# Patient Record
Sex: FEMALE | Race: WHITE | NOT HISPANIC OR LATINO | URBAN - NONMETROPOLITAN AREA
[De-identification: names, ages, dates, MRNs, and addresses within clinical notes are randomized per-mention and may not be internally consistent; named-entity substitution may affect disease eponyms.]

---

## 2018-04-04 ENCOUNTER — NEW PATIENT (OUTPATIENT)
Dept: URBAN - NONMETROPOLITAN AREA CLINIC 6 | Facility: CLINIC | Age: 18
End: 2018-04-04
Payer: COMMERCIAL

## 2018-04-04 DIAGNOSIS — H52.13 MYOPIA, BILATERAL: Primary | ICD-10-CM

## 2018-04-04 DIAGNOSIS — R51 HEADACHE: ICD-10-CM

## 2018-04-04 PROCEDURE — 92015 DETERMINE REFRACTIVE STATE: CPT | Performed by: OPTOMETRIST

## 2018-04-04 PROCEDURE — 92004 COMPRE OPH EXAM NEW PT 1/>: CPT | Performed by: OPTOMETRIST

## 2018-04-04 ASSESSMENT — VISUAL ACUITY
OS: 20/20
OD: 20/20

## 2018-04-04 ASSESSMENT — INTRAOCULAR PRESSURE
OS: 15
OD: 15

## 2019-07-11 ENCOUNTER — FOLLOW UP ESTABLISHED (OUTPATIENT)
Dept: URBAN - NONMETROPOLITAN AREA CLINIC 6 | Facility: CLINIC | Age: 19
End: 2019-07-11
Payer: COMMERCIAL

## 2019-07-11 PROCEDURE — 92015 DETERMINE REFRACTIVE STATE: CPT | Performed by: OPTOMETRIST

## 2019-07-11 PROCEDURE — 92014 COMPRE OPH EXAM EST PT 1/>: CPT | Performed by: OPTOMETRIST

## 2019-07-11 ASSESSMENT — VISUAL ACUITY
OD: 20/20
OS: 20/20

## 2019-07-11 ASSESSMENT — INTRAOCULAR PRESSURE
OS: 14
OD: 14

## 2020-06-18 ENCOUNTER — FOLLOW UP ESTABLISHED (OUTPATIENT)
Dept: URBAN - NONMETROPOLITAN AREA CLINIC 6 | Facility: CLINIC | Age: 20
End: 2020-06-18
Payer: COMMERCIAL

## 2020-06-18 PROCEDURE — 92015 DETERMINE REFRACTIVE STATE: CPT | Performed by: OPTOMETRIST

## 2020-06-18 PROCEDURE — 92014 COMPRE OPH EXAM EST PT 1/>: CPT | Performed by: OPTOMETRIST

## 2020-06-18 ASSESSMENT — VISUAL ACUITY
OD: 20/20
OS: 20/20

## 2020-06-18 ASSESSMENT — INTRAOCULAR PRESSURE
OD: 14
OS: 14

## 2020-07-07 ENCOUNTER — FOLLOW UP ESTABLISHED (OUTPATIENT)
Dept: URBAN - NONMETROPOLITAN AREA CLINIC 6 | Facility: CLINIC | Age: 20
End: 2020-07-07

## 2020-07-07 PROCEDURE — 92310 CONTACT LENS FITTING OU: CPT | Performed by: OPTOMETRIST

## 2021-07-12 ENCOUNTER — OFFICE VISIT (OUTPATIENT)
Dept: URBAN - NONMETROPOLITAN AREA CLINIC 6 | Facility: CLINIC | Age: 21
End: 2021-07-12
Payer: COMMERCIAL

## 2021-07-12 PROCEDURE — 92014 COMPRE OPH EXAM EST PT 1/>: CPT | Performed by: OPTOMETRIST

## 2021-07-12 ASSESSMENT — INTRAOCULAR PRESSURE
OD: 13
OS: 14

## 2021-07-12 ASSESSMENT — VISUAL ACUITY
OD: 20/20
OS: 20/20

## 2022-08-01 ENCOUNTER — OFFICE VISIT (OUTPATIENT)
Dept: URBAN - NONMETROPOLITAN AREA CLINIC 6 | Facility: CLINIC | Age: 22
End: 2022-08-01
Payer: COMMERCIAL

## 2022-08-01 DIAGNOSIS — H52.13 MYOPIA, BILATERAL: Primary | ICD-10-CM

## 2022-08-01 PROCEDURE — 92014 COMPRE OPH EXAM EST PT 1/>: CPT | Performed by: OPTOMETRIST

## 2022-08-01 ASSESSMENT — VISUAL ACUITY
OD: 20/20
OS: 20/20

## 2022-08-01 NOTE — IMPRESSION/PLAN
Impression: Myopia, bilateral: H52.13. Plan: Discussed adjustment period. Glasses Rx given to patient today. Return to clinic if any changes occur. AVOA Trials to be ordered and dispensed. Patient to evaluate VA and comfort.

## 2023-12-22 ENCOUNTER — OFFICE VISIT (OUTPATIENT)
Dept: URBAN - NONMETROPOLITAN AREA CLINIC 6 | Facility: CLINIC | Age: 23
End: 2023-12-22
Payer: COMMERCIAL

## 2023-12-22 DIAGNOSIS — H43.393 OTHER VITREOUS OPACITIES, BILATERAL: ICD-10-CM

## 2023-12-22 DIAGNOSIS — H52.13 MYOPIA, BILATERAL: Primary | ICD-10-CM

## 2023-12-22 PROCEDURE — 92014 COMPRE OPH EXAM EST PT 1/>: CPT | Performed by: OPTOMETRIST

## 2023-12-22 ASSESSMENT — VISUAL ACUITY
OS: 20/20
OD: 20/20

## 2023-12-22 ASSESSMENT — INTRAOCULAR PRESSURE
OD: 20
OS: 15

## 2023-12-22 ASSESSMENT — KERATOMETRY
OD: 44.81
OS: 44.68